# Patient Record
Sex: FEMALE | Race: WHITE | NOT HISPANIC OR LATINO | Employment: PART TIME | URBAN - METROPOLITAN AREA
[De-identification: names, ages, dates, MRNs, and addresses within clinical notes are randomized per-mention and may not be internally consistent; named-entity substitution may affect disease eponyms.]

---

## 2023-08-30 ENCOUNTER — HOSPITAL ENCOUNTER (EMERGENCY)
Facility: CLINIC | Age: 33
Discharge: HOME | End: 2023-08-30
Attending: STUDENT IN AN ORGANIZED HEALTH CARE EDUCATION/TRAINING PROGRAM | Admitting: STUDENT IN AN ORGANIZED HEALTH CARE EDUCATION/TRAINING PROGRAM
Payer: COMMERCIAL

## 2023-08-30 VITALS
OXYGEN SATURATION: 99 % | HEART RATE: 96 BPM | DIASTOLIC BLOOD PRESSURE: 85 MMHG | TEMPERATURE: 98.5 F | RESPIRATION RATE: 18 BRPM | SYSTOLIC BLOOD PRESSURE: 126 MMHG

## 2023-08-30 DIAGNOSIS — T78.40XA ALLERGIC REACTION, INITIAL ENCOUNTER: ICD-10-CM

## 2023-08-30 PROCEDURE — 99283 EMERGENCY DEPT VISIT LOW MDM: CPT

## 2023-08-30 RX ORDER — FAMOTIDINE 20 MG/1
TABLET, FILM COATED ORAL
Status: DISCONTINUED
Start: 2023-08-30 | End: 2023-08-30 | Stop reason: HOSPADM

## 2023-08-30 RX ORDER — DEXAMETHASONE 2 MG/1
TABLET ORAL
Status: DISCONTINUED
Start: 2023-08-30 | End: 2023-08-30 | Stop reason: HOSPADM

## 2023-08-30 RX ORDER — EPINEPHRINE 0.3 MG/.3ML
0.3 INJECTION SUBCUTANEOUS
Qty: 2 EACH | Refills: 0 | Status: SHIPPED | OUTPATIENT
Start: 2023-08-30

## 2023-08-30 RX ORDER — DEXAMETHASONE 4 MG/1
TABLET ORAL
Status: DISCONTINUED
Start: 2023-08-30 | End: 2023-08-30 | Stop reason: HOSPADM

## 2023-08-30 NOTE — ED PROVIDER NOTES
History     Chief Complaint:  Allergic Reaction       HPI   Salome Lozano is a 32 year old female, history of cat allergies, presents with rash. Patient works at [employer]. She interacted with a cat at work. She later developed an itchy rash diffusely over her body. Went home and while eating, noted a swelling sensation in her throat and had wheezing. Does have history of seasonal allergies triggering asthma. Took her inhaler and wheezing improving. Took benadryl with some improvement in her rash. Also used a new detergent starting three days ago. Had used in the past, but not recently. No history of anaphylaxis.     Patient also has been doing with a cold for the last couple weeks including sore throat, runny nose and cough which she thinks could be contributing to her wheezing symptoms.      Independent Historian:    none    Review of External Notes:  none      Medications:    EPINEPHrine (ANY BX GENERIC EQUIV) 0.3 MG/0.3ML injection 2-pack  CLONAZEPAM PO  FLUoxetine HCl (PROZAC PO)  UNKNOWN TO PATIENT        Past Medical History:    Past Medical History:   Diagnosis Date    Anxiety     Depressive disorder        Past Surgical History:    Past Surgical History:   Procedure Laterality Date    PARTIAL HYMENECTOMY/REVISION HYMENAL RING            Physical Exam   Patient Vitals for the past 24 hrs:   BP Temp Temp src Pulse Resp SpO2   08/30/23 0340 -- 98.5  F (36.9  C) Oral -- -- 98 %   08/30/23 0339 -- -- -- -- -- 97 %   08/30/23 0338 -- -- -- -- -- 98 %   08/30/23 0337 -- -- -- -- -- 97 %   08/30/23 0336 -- -- -- -- -- 97 %   08/30/23 0335 -- -- -- -- -- 97 %   08/30/23 0334 -- -- -- 96 18 98 %   08/30/23 0332 126/85 -- -- 94 -- --   08/30/23 0328 136/81 -- -- -- -- --        Physical Exam  GENERAL: Patient uncomfortable and itching in her arms and torso.  HEAD: Atraumatic.  NECK: No rigidity  Mouth: No oropharyngeal, tongue, or lip swelling.  Normal voice.  CV: RRR, no murmurs rubs or gallops  PULM: Minimal  expiratory wheezing bilaterally.  No respiratory distress.  ABD: Soft, nontender, nondistended, no guarding  DERM: Diffuse blanching hives.  Skin warm and dry  EXTREMITY: Moving all extremities without difficulty.        Emergency Department Course        Imaging:  No orders to display          Laboratory:  Labs Ordered and Resulted from Time of ED Arrival to Time of ED Departure - No data to display        Emergency Department Course & Assessments:             Interventions:  Medications - No data to display          Independent Interpretation (X-rays, CTs, rhythm strip):  None    Consultations/Discussion of Management or Tests:  None       Social Determinants of Health affecting care:  none     Disposition:  The patient was discharged to home.     Impression & Plan         Medical Decision Making:  Symptoms most consistent with allergic reaction.     Vital signs unremarkable.     Chronic conditions complicating -cat allergies, asthma.    DDx anaphylaxis, pheochromocytoma, carcinoid syndrome, however evaluation not consistent with these etiologies.     Due to her having a rash and subjective feeling swelling in her throat and mild wheezing, patient candidate for IM epinephrine which was given.  Afterwards patient felt much better.  Repeat lung exam I do not hear wheezing.    Monitored without issue. Patient requesting to leave.     Recommended avoiding potential culprits.  Recommend she consider other areas of employment as she has a cat allergy.  Also recommend she avoid the laundry detergent she just started using.    Given prescription for EpiPen.    Patient was evaluated for acute medical emergencies. Based on my clinical assessment, I do not think any further acute management or work-up is required.  Patient stable for discharge. Given strict return precautions. All questions answered. Patient content with plan. Recommended PCP follow-up in 2-3 days.      Critical Care time:  was 0 minutes for this patient  excluding procedures.    Diagnosis:    ICD-10-CM    1. Allergic reaction, initial encounter  T78.40XA            Discharge Medications:  New Prescriptions    EPINEPHRINE (ANY BX GENERIC EQUIV) 0.3 MG/0.3ML INJECTION 2-PACK    Inject 0.3 mLs (0.3 mg) into the muscle once as needed for anaphylaxis May repeat one time in 5-15 minutes if response to initial dose is inadequate.          Cosme Villareal MD  8/30/2023   Cosme Villareal MD Foss, Kevin, MD  08/30/23 9277      Patient was getting ready to leave and she put her clothes back on and the rash came back.  Explained to patient that I think there is an allergen in her close either the detergent or from the prior cat exposure.  She does not want any more medications and she would like to go home.  She is breathing comfortably.  We provided her with paper scrubs for home. We thoroughly discussed avoiding allergens.     Cosme Villareal MD  08/30/23 2178

## 2023-08-30 NOTE — DISCHARGE INSTRUCTIONS
Discharge Instructions  Allergic Reaction    An allergic reaction can result in a rash, itching, swelling, watery eyes, or a runny nose. A serious reaction can cause swelling of your mouth or throat, or difficulty breathing (wheezing). The most serious allergy is called anaphylaxis, and can be life-threatening. Many allergies result in hives, also called urticaria.       An allergy happens when the body s natural defense system (immune system) overreacts to something. The thing that triggers your allergic reaction is called an allergen. The first time you are exposed to your allergen, you may not have any reaction, but the body makes a protein called an antibody. The antibody lets the body recognize and remember the allergen.  Every time you are exposed to your allergen you get more antibody and your reaction can be more severe.      Generally, every Emergency Department visit should have a follow-up clinic visit with either a primary or a specialty clinic/provider. Please follow-up as instructed by your emergency provider today.    Call 911 if you have:  Swelling of the lips, tongue or throat.  Hoarse voice, drooling or trouble breathing.  Chest pain or shortness of breath.  Fainting or unconsciousness.    What can I do to help myself?  If you know what caused your allergy, do not touch it, throw any of it away, and tell others not to have it around you. Wear a medical alert bracelet with a name of your allergen on it.  If you do not know what you are allergic to, keep a journal of everything that you are exposed to (foods, soaps, medicines, etc.). Take this with you when you follow up with your primary provider or specialist (Allergist). This may help determine what is causing the allergic reaction.  Take any medicines that are prescribed.  Antihistamines can decrease rash or itching. You may use Benadryl  (diphenhydramine) for rash or itching according to package directions, or use a prescription antihistamine as  recommended by your provider.  For significant allergic reactions, you may have been given a prescription for an epinephrine (adrenaline) auto injector. Carry this with you at all times! Use it if you are having any symptoms of anaphylaxis.  Do not be afraid to use it. Return to the Emergency Department if you use your auto injector, call 911 if it does not resolve the symptoms. It is only meant to buy time until you can get to the Emergency Department!  If you were given a prescription for medicine here today, be sure to read all of the information (including the package insert) that comes with your prescription.  This will include important information about the medicine, its side effects, and any warnings that you need to know about.  The pharmacist who fills the prescription can provide more information and answer questions you may have about the medicine.  If you have questions or concerns that the pharmacist cannot address, please call or return to the Emergency Department.   Remember that you can always come back to the Emergency Department if you are not able to see your regular provider in the amount of time listed above, if you get any new symptoms, or if there is anything that worries you.  Return to the emergency department if symptoms are worsening, become concerning, or for any other concerns. Follow-up with your doctor in 2-3 and sooner if needed.